# Patient Record
Sex: MALE | Race: AMERICAN INDIAN OR ALASKA NATIVE
[De-identification: names, ages, dates, MRNs, and addresses within clinical notes are randomized per-mention and may not be internally consistent; named-entity substitution may affect disease eponyms.]

---

## 2019-01-20 ENCOUNTER — HOSPITAL ENCOUNTER (EMERGENCY)
Dept: HOSPITAL 5 - ED | Age: 44
End: 2019-01-20
Payer: SELF-PAY

## 2019-01-20 DIAGNOSIS — I46.9: Primary | ICD-10-CM

## 2019-01-20 PROCEDURE — 99291 CRITICAL CARE FIRST HOUR: CPT

## 2019-01-20 PROCEDURE — 31500 INSERT EMERGENCY AIRWAY: CPT

## 2019-01-20 NOTE — EMERGENCY DEPARTMENT REPORT
ED CPR HPI





- General


Stated Complaint: CARDIAC ARREST


Time Seen by Provider: 19 17:30


Source: EMS


Mode of arrival: Stretcher


Limitations: Altered Mental Status, Physical Limitation





- History of Present Illness


MD Complaint: found unresponsive


Place: home


Bystander CPR Performed: No


AED Applied by Bystander/: No


Shock Advised: No


Initial Findings in the Field: unresponsive


ROSC in the Field: No


Associated Injuries: No


Treatments Prior to Arrival: BMV, other airway device, chest compressions, 

epinephrine mgs #, sodium bicarbonate, glucose





ED Review of Systems


ROS: 


Stated complaint: CARDIAC ARREST


Other details as noted in HPI





Comment: Unobtainable due to pts medical conditions





ED Past Medical Hx





- Past Medical History


Previous Medical History?: Yes


Additional medical history: stomach cancer





- Surgical History


Past Surgical History?: No





- Family History


Family history: no significant





- Social History


Smoking Status: Never Smoker


Substance Use Type: None





ED Physical Exam





- General


Limitations: Altered Mental Status, Physical Limitation


General appearance: obtunded





- Head


Head exam: Present: atraumatic, normocephalic





- Eye


Eye exam: Present: other (he was fixed dilated)





- ENT


ENT exam: Present: other (dark black vomitus in oral cavity)





- Neck


Neck exam: Present: normal inspection





- Cardiovascular


Cardiovascular Exam: Present: other (no pulse noted)





- GI/Abdominal


GI/Abdominal exam: Present: distended





- Extremities Exam


Extremities exam: Present: normal inspection





- Skin


Skin exam: Present: warm, dry, intact





ED Course





- Reevaluation(s)


Reevaluation #1: 


Patient arrived by EMS.  Patient of cardiac arrest.  The patient is intubated 

with a Glenn tube.  Copious amounts of dark vomitus coming out of the tube.  

Patient extubated and reintubated medially with an ET tube,  See procedure note.

 Code ran in accordance with ACLS protocol.


19 17:26








Resuscitation efforts discontinued.  Resuscitation efforts unsuccessful.  No 

signs of life.  No pulse.  The patient given multiple rounds of medication prior

to arrival by EMS and multiple medications given in hospital.  code ran in 

accordance with ACLS protocol.  See code note.


19 17:39











Family support given. family meeting done


19 18:01








ED Medical Decision Making





- Medical Decision Making


Patient is a 43-year-old male that presents emergency room in full cardiac 

arrest.  Resuscitation efforts were unsuccessful and terminated.  Code ran in 

accordance with ACLS protocol.  Family meeting done.  Family support given.








- Differential Diagnosis


CPR.  Cardiac arrest


Critical Care Time: Yes


Critical care attestation.: 


If time is entered above; I have spent that time in minutes in the direct care 

of this critically ill patient, excluding procedure time.





Critical Care Time: 





35 minutes





ED Disposition


Clinical Impression: 


 Cardiac arrest





Disposition: DC-20 


Is pt being admited?: No


Does the pt Need Aspirin: No


Condition: Undetermined


Time of Disposition: 18:30